# Patient Record
Sex: FEMALE | Race: WHITE | Employment: PART TIME | URBAN - METROPOLITAN AREA
[De-identification: names, ages, dates, MRNs, and addresses within clinical notes are randomized per-mention and may not be internally consistent; named-entity substitution may affect disease eponyms.]

---

## 2017-04-28 ENCOUNTER — TELEPHONE (OUTPATIENT)
Dept: CARDIOLOGY CLINIC | Age: 47
End: 2017-04-28

## 2018-04-03 ENCOUNTER — HOSPITAL ENCOUNTER (OUTPATIENT)
Dept: PHYSICAL THERAPY | Age: 48
Discharge: HOME | End: 2018-04-03
Payer: COMMERCIAL

## 2018-04-03 PROCEDURE — 97162 PT EVAL MOD COMPLEX 30 MIN: CPT | Performed by: PHYSICAL THERAPIST

## 2018-04-03 PROCEDURE — 97110 THERAPEUTIC EXERCISES: CPT | Performed by: PHYSICAL THERAPIST

## 2018-04-03 NOTE — PROGRESS NOTES
PT  EVAL AND TREATMENT    Patient Name: Jenni Friedman  Date:4/3/2018  : 1970  [x]  Patient  Verified  Payor: BLUE CROSS / Plan: [employer] [facility] [place] / Product Type: PPO /    In time:1005am  Out time:100  Total Treatment Time (min): 55  Total Timed Codes (min): 55  1:1 Treatment Time ( only): 55   Visit #: 1     Treatment Area: Chronic left shoulder pain [M25.512, G89.29]  Patient reports L shoulder decreased mvmt over the past couple of months. Patient reports she had hx of R frozen shoulder and RC sx in . She had an injection in L shld on 3/20/18 which significantly decreased pain and increased mvmt. Patient has hx of falls due to MS, and reports several falls in Oct of 2017 which could possibly have caused L shld pain and sx.      Objective evaluation:  Physical Therapy Evaluation - Shoulder    Posture: [] Poor    [x] Fair    [] Good    Describe:Dowagers Hump and FHRS posture  ROM:  [] Unable to assess at this time                                           AROM                                                              PROM   Left Right  Left Right   Flexion 115 130 Flexion 142    Extension 52 68 Extension     Scaption/ 112 Scaption/    ER @ 0 Degrees 45 60 ER @ 0 Degrees     ER @ 90 Degrees 45  ER @ 90 Degrees 50    IR @ 90 Degrees 85  IR @ 90 Degrees 87    FIR                                L3              T9  End Feel / Painful Arc:    Strength:   [] Unable to assess at this time  *  Mild discomfort with all resistance testing =                                                                        L (1-5) R (1-5) Pain   Flexors 4+  [] Yes   [] No   Abductors 4+  [] Yes   [] No   External Rotators 4+  [] Yes   [] No   Internal Rotators 4+  [] Yes   [] No   Supraspinatus 4  [] Yes   [] No   Serratus Anterior   [] Yes   [] No   Lower Trapezius 3+  [] Yes   [] No   Elbow Flexion 4+  [] Yes   [] No   Elbow Extension 4+  [] Yes   [] No       Scapulohumoral Control / Rhythm:  Able to eccentrically lower with good control? Left: [x] Yes   [] No     Right: [x] Yes   [] No    Accessory Motions: decreased inferior /post glide    Palpation  [] Min  [x] Mod  [] Severe    Location:  TTP over L biceps tendon  [] Min  [] Mod  [] Severe    Location:  [] Min  [] Mod  [] Severe    Location:    SPECIAL tests    Adson's Test  [] Pos   [] Neg Yergason's Test [] Pos   [] Neg  [place]'s Test  [] Pos   [] Neg Cortland's Sign [] Pos   [] Neg  Neer's Test  [x] Pos   [] Neg Clunk Test  [] Pos   [] Neg  Hawkin's Test  [] Pos   [] Neg AC Joint  [] Pos   [] Neg  Speed's Test  [x] Pos   [] Neg SC Joint  [] Pos   [] Neg  Empty Can  [] Pos   [] Neg Pectoral Tightness [x] Pos   [] Neg  Anterior Apprehension [] Pos   [] Neg   Posterior Apprehension [] Pos   [] Neg      Other Tests / Comments:       Justification for Eval Code Complexity:  Patient History : MS, Hx of R frozen shld  Examination see exam as above   Clinical Presentation: evolving sx  Clinical Decision Making : FOTO : 56 /100    Manual: 5 min;    Inferior/post glides       Patient Education: [x] Established HEP    [x] POT (minutes) :HEP 15 min    Pain Level (0-10 scale) post treatment: 0  ASSESSMENT  [x]  See Plan of Care    PLAN  [x]  Upgrade activities as tolerated     [x] Other:_ POC  2x/wk for 4 wks    Arielle Rivers, PT 4/3/2018  9:16 AM

## 2018-04-03 NOTE — PROGRESS NOTES
[facility] AT [address]  Phone: [phone] Fax: [phone] / [address] [address] PHYSICAL THERAPY SERVICES  Patient Name: Jarocho Nguyen : 1970   Medical   Diagnosis: Chronic left shoulder pain [M25.512, G89.29] Treatment Diagnosis: L frozen shoulder   Onset Date: 2 months ago      Referral Source: Ceasar Nicole MD Start of Care [facility]): 4/3/2018   Prior Hospitalization: See medical history Provider #: [id number]   Prior Level of Function: Hx of falls due to MS, Hx of R shld sx, I in all ALDs   Comorbidities: MS, and OA   Medications: Verified on Patient Summary List   The Plan of Care and following information is based on the information from the initial evaluation.   ========================================================================  Assessment / key information:  Patient is a 56 y.o. female who presents to [facility] with Dx of L shoulder adhesive capsulitis. Patient reports L shoulder decreased mvmt over the past couple of months. Patient reports she had hx of R frozen shoulder and RC sx in . She had an injection in L shld on 3/20/18 which significantly decreased pain and increased mvmt. Patient has hx of falls due to MS, and reports several falls in Oct of 2017 which could possibly have caused L shld pain and sx. Upon objective evaluation patient demonstrates FHRS posture, AROM per chart below, Pain noted with FIR to L3. Mild pain noted with all resistive testing. Strength: 4+ overall for RC, Lower trap strength: 3+, accessory motion/capsular tightness with Inferior and posterior glides . TTP noted over Long head of biceps and + Speeds noted. A home exercise program was demonstrated and provided to address the above objective and functional deficits. Patient scored 56 on FOTO indicating decreased functional activity level and QOL. Patient can benefit from PT interventions to improve AROM, scapular strength, decrease pain and capsular tightness, to facilitate ADLs & overall functional status. AROM                                                              PROM    Left Right   Left Right   Flexion 115 130 Flexion 142     Extension 52 68 Extension       Scaption/ 112 Scaption/     ER @ 0 Degrees 45 60 ER @ 0 Degrees       ER @ 90 Degrees 45   ER @ 90 Degrees 50     IR @ 90 Degrees 85   IR @ 90 Degrees 87     FIR                                             L3              T9    ========================================================================  Eval Complexity: History: MEDIUM  Complexity : 1-2 comorbidities / personal factors will impact the outcome/ POC Exam:MEDIUM Complexity : 3 Standardized tests and measures addressing body structure, function, activity limitation and / or participation in recreation  Presentation: MEDIUM Complexity : Evolving with changing characteristics  Clinical Decision Making:MEDIUM Complexity : FOTO score of 26-74Overall Complexity:MEDIUM  Problem List: pain affecting function, decrease ROM, decrease strength, decrease ADL/ functional abilitiies, decrease activity tolerance and decrease flexibility/ joint mobility   Treatment Plan may include any combination of the following: Therapeutic exercise, Therapeutic activities, Neuromuscular re-education, Physical agent/modality, Gait/balance training, Manual therapy, Aquatic therapy, Patient education, Self Care training and Functional mobility training  Patient / Family readiness to learn indicated by: asking questions, trying to perform skills and interest  Persons(s) to be included in education: patient (P)  Barriers to Learning/Limitations: None  Measures taken:    Patient Goal (s): \"More movement and to sleep on L side. \"   Patient self reported health status: good  Rehabilitation Potential: good   Short Term Goals:  To be accomplished in  2 weeks:  1) Establish HEP to prevent further disability. 2.) Patient to have increased L shoulder flexion to 120* to facilitate OH reaching.  Long Term Goals: To be accomplished in  4  weeks:  1) Patient to be independent & compliant with HEP in preparation for D/C.  2) Improve overall strength of L shld and scapular mm to 4+/5 strength is available for return to light lifting activities at work/home and for postural support to decrease stress to shld jt. 3) Patient to report 75% improvement in overall function in preparation for return to recreational activities with manageable sx in L shoulder. 4.) Patient able to perform FIR to T12 to allow for dressing and reaching behind. 5) Patient to increase FOTO score to 68 indicating improved functional abilities and QOL    Frequency / Duration:   Patient to be seen  2  times per week for 8-12  treatments:  Patient / Caregiver education and instruction: self care and exercises  G-Codes (GP):   Therapist Signature: Brendan Don PT Date: 2/4/4387   Certification Period:  Time: 9:16 AM   ========================================================================  I certify that the above Physical Therapy Services are being furnished while the patient is under my care. I agree with the treatment plan and certify that this therapy is necessary. Physician Signature:        Date:       Time:   Please sign and return to [facility] or you may fax the signed copy to [phone]. Thank you.

## 2018-04-06 ENCOUNTER — HOSPITAL ENCOUNTER (OUTPATIENT)
Dept: PHYSICAL THERAPY | Age: 48
Discharge: HOME | End: 2018-04-06
Payer: COMMERCIAL

## 2018-04-06 PROCEDURE — 97110 THERAPEUTIC EXERCISES: CPT

## 2018-04-06 PROCEDURE — 97140 MANUAL THERAPY 1/> REGIONS: CPT

## 2018-04-06 NOTE — PROGRESS NOTES
PT DAILY TREATMENT NOTE     Patient Name: Mikel Sosa  Date:2018  : 1970  [x]  Patient  Verified  Payor: BLUE CROSS / Plan: [employer] [facility] [place] / Product Type: PPO /    In time: 9:01 am      Out time: 9:54 am  Total Treatment Time (min): 53  Visit #: 2 of     Treatment Area: Chronic left shoulder pain [M25.512, G89.29]    SUBJECTIVE  Pain Level (0-10 scale): 0  Any medication changes, allergies to medications, adverse drug reactions, diagnosis change, or new procedure performed?: [x] No    [] Yes (see summary sheet for update)  Subjective functional status/changes:   [] No changes reported  \"No pain today, but I haven't really moved it. I have been doing the exercises at home and improving, but I still cannot sleep on my left shoulder. \"    OBJECTIVE  Modality rationale: decrease edema, decrease inflammation and decrease post-therex soreness to improve the patients ability to perform ADLs   Min Type Additional Details    [] Estim: []Att   []Unatt        []TENS instruct                  []IFC  []Premod   []NMES                     []Other:  []w/US   []w/ice   []w/heat  Position:  Location:    []  Traction: [] Cervical       []Lumbar                       [] Prone          []Supine                       []Intermittent   []Continuous Lbs:  [] before manual  [] after manual    []  Ultrasound: []Continuous   [] Pulsed                           []1MHz   []3MHz Location:  W/cm2:    []  Iontophoresis with dexamethasone         Location: [] Take home patch   [] In clinic   10 [x]  Ice     []  heat  []  Ice massage Position: seated  Location: (L) shoulder    []  Vasopneumatic Device Pressure:       [] lo [] med [] hi   Temperature: [] lo [] med [] hi   [x] Skin assessment post-treatment:  [x]intact []redness- no adverse reaction       []redness  adverse reaction:     33 min Therapeutic Exercise:  [x] See flow sheet: initiated therex per IE   Rationale: increase ROM, increase strength and improve coordination to improve the patients ability to perform work duties, [place] reach, lifting    10 min Manual Therapy:  grade III/IV (L) GHJ mobs inferior and posteriorly f/b PROM flexion, scaption, ER and IR   Rationale: decrease pain, increase ROM and increase tissue extensibility to improve the patient's ability to reach OH      X min Patient Education: [x] Review HEP     Other Objective/Functional Measures:   AROM: FIR to T12  AAROM with wall slides: 145 deg  Posture: FHRS - pt cognizant of importance of proper posture to improve pain levels - may consider addition of k-taping for posture NV    Pain Level (0-10 scale) post treatment: 0 (pain); 1 (\"muscle soreness\")    ASSESSMENT/Changes in Function: .  Good tolerance to treatment today with patient req 50% verbal/tactile cueing and demo for proper form/technique with all newly introduced therex. Patient demos (I) with some therex sec hx of (R) shoulder RTC repair and frozen shoulder. Increased capsular tightness inferiorly and posteriorly addressed well with manual interventions. Patient will continue to benefit from skilled PT services to modify and progress therapeutic interventions, address functional mobility deficits, address ROM deficits, address strength deficits, analyze and address soft tissue restrictions, analyze and cue movement patterns and analyze and modify body mechanics/ergonomics to attain remaining goals. [x]  See Plan of Care  []  See progress note/recertification  []  See Discharge Summary         Progress towards goals / Updated goals:  Short Term Goals: To be accomplished in  2  weeks:  1) Establish HEP to prevent further disability.   -Goal progressing; HEP est with all questions answered (4/6/18)  2.) Patient to have increased L shoulder flexion to 120 to facilitate OH reaching.  -Goal progressing; AAROM to 145 deg flexion with wall V's (4/6/18)  Long Term Goals:  To be accomplished in  4  weeks:  1) Patient to be independent & compliant with HEP in preparation for D/C.  2) Improve overall strength of L shld and scapular mm to 4+/5 strength is available for return to light lifting activities at work/home and for postural support to decrease stress to shld jt. 3) Patient to report 75% improvement in overall function in preparation for return to recreational activities with manageable sx in L shoulder.   4.) Patient able to perform FIR to T12 to allow for dressing and reaching behind. -Goal met today following MT; FIR to T12 (4/6/18)  5) Patient to increase FOTO score to 68 indicating improved functional abilities and QOL    PLAN  [x]  Upgrade activities as tolerated     [x]  Continue plan of care  []  Update interventions per flow sheet       []  Discharge due to:_  [x]  Other: assess response to first f/u    Driss Art, PTA 4/6/2018

## 2018-04-09 ENCOUNTER — HOSPITAL ENCOUNTER (OUTPATIENT)
Dept: PHYSICAL THERAPY | Age: 48
Discharge: HOME | End: 2018-04-09
Payer: COMMERCIAL

## 2018-04-09 PROCEDURE — 97110 THERAPEUTIC EXERCISES: CPT

## 2018-04-09 PROCEDURE — 97140 MANUAL THERAPY 1/> REGIONS: CPT

## 2018-04-09 NOTE — PROGRESS NOTES
PT DAILY TREATMENT NOTE     Patient Name: Yesy Mckeon  Date:2018  : 1970  [x]  Patient  Verified  Payor: BLUE CROSS / Plan: [employer] [facility] [place] / Product Type: PPO /    In time: 10:59 am     Out time: 11:42 am  Total Treatment Time (min): 43  Visit #: 3 of     Treatment Area: Chronic left shoulder pain [M25.512, G89.29]    SUBJECTIVE  Pain Level (0-10 scale): 0   Any medication changes, allergies to medications, adverse drug reactions, diagnosis change, or new procedure performed?: [x] No    [] Yes (see summary sheet for update)  Subjective functional status/changes:   [] No changes reported  \"I am getting more mobility in the shoulder, but I know I have to push it. I did have some face numbness over the weekend, but that is more related to my MS. \"    OBJECTIVE  Modality rationale: decrease edema, decrease inflammation and decrease post-therex soreness to improve the patients ability to perform ADLs   Min Type Additional Details    [] Estim: []Att   []Unatt        []TENS instruct                  []IFC  []Premod   []NMES                     []Other:  []w/US   []w/ice   []w/heat  Position:  Location:    []  Traction: [] Cervical       []Lumbar                       [] Prone          []Supine                       []Intermittent   []Continuous Lbs:  [] before manual  [] after manual    []  Ultrasound: []Continuous   [] Pulsed                           []1MHz   []3MHz Location:  W/cm2:    []  Iontophoresis with dexamethasone         Location: [] Take home patch   [] In clinic   NT [x]  Ice     []  heat  []  Ice massage Position: seated  Location: (L) shoulder    []  Vasopneumatic Device Pressure:       [] lo [] med [] hi   Temperature: [] lo [] med [] hi   [x] Skin assessment post-treatment:  [x]intact []redness- no adverse reaction       []redness  adverse reaction:     33 min Therapeutic Exercise:  [x] See flow sheet: added scap BOW CW/CCW   Rationale: increase ROM, increase strength and improve coordination to improve the patients ability to perform work duties, [place] reach, lifting    10 min Manual Therapy:  grade III/IV (L) GHJ mobs inferior and posteriorly f/b PROM flexion, scaption, ER and IR   Rationale: decrease pain, increase ROM and increase tissue extensibility to improve the patient's ability to reach OH      X min Patient Education: [x] Review HEP     Other Objective/Functional Measures:   AAROM flexion with wall slides: 152 deg  HR: 90 bpm    Pain Level (0-10 scale) post treatment: 0    ASSESSMENT/Changes in Function: . Patient notes first f/u treatment did not cause any adverse reactions. Slowly improving shoulder mobility into elevation. Good tolerance to therex, however, pt did note dizziness after prolonged standing (notes MS sx), therefore, held further tx and provided water with improvement in sx. Patient will continue to benefit from skilled PT services to modify and progress therapeutic interventions, address functional mobility deficits, address ROM deficits, address strength deficits, analyze and address soft tissue restrictions, analyze and cue movement patterns and analyze and modify body mechanics/ergonomics to attain remaining goals. [x]  See Plan of Care  []  See progress note/recertification  []  See Discharge Summary         Progress towards goals / Updated goals:  Short Term Goals: To be accomplished in  2  weeks:  1) Establish HEP to prevent further disability.   -Goal progressing; HEP est with all questions answered (4/6/18)  2.) Patient to have increased L shoulder flexion to 120 to facilitate OH reaching.  -Goal progressing; AAROM to 145 deg flexion with wall V's (4/6/18)  Long Term Goals:  To be accomplished in  4  weeks:  1) Patient to be independent & compliant with HEP in preparation for D/C.  2) Improve overall strength of L shld and scapular mm to 4+/5 strength is available for return to light lifting activities at work/home and for postural support to decrease stress to shld jt. 3) Patient to report 75% improvement in overall function in preparation for return to recreational activities with manageable sx in L shoulder.   4.) Patient able to perform FIR to T12 to allow for dressing and reaching behind. -Goal met today following MT; FIR to T12 (4/6/18)  5) Patient to increase FOTO score to 68 indicating improved functional abilities and QOL    PLAN  [x]  Upgrade activities as tolerated     [x]  Continue plan of care  []  Update interventions per flow sheet       []  Discharge due to:_  [x]  Other:_    Madalyn Walsh, SHARYN 4/9/2018

## 2018-04-12 ENCOUNTER — APPOINTMENT (OUTPATIENT)
Dept: PHYSICAL THERAPY | Age: 48
End: 2018-04-12
Payer: COMMERCIAL

## 2018-04-13 ENCOUNTER — APPOINTMENT (OUTPATIENT)
Dept: PHYSICAL THERAPY | Age: 48
End: 2018-04-13
Payer: COMMERCIAL

## 2018-04-16 ENCOUNTER — HOSPITAL ENCOUNTER (OUTPATIENT)
Dept: PHYSICAL THERAPY | Age: 48
Discharge: HOME | End: 2018-04-16
Payer: COMMERCIAL

## 2018-04-16 PROCEDURE — 97110 THERAPEUTIC EXERCISES: CPT

## 2018-04-16 PROCEDURE — 97140 MANUAL THERAPY 1/> REGIONS: CPT

## 2018-04-16 NOTE — PROGRESS NOTES
PT DAILY TREATMENT NOTE     Patient Name: Sreekanth Law  Date:2018  : 1970  [x]  Patient  Verified  Payor: BLUE CROSS / Plan: [facility] [place] / Product Type: PPO /    In time: 10:30 am     Out time: 11:38 am  Total Treatment Time (min): 68  Visit #: 4 of     Treatment Area: Chronic left shoulder pain [M25.512, G89.29]    SUBJECTIVE  Pain Level (0-10 scale): 0   Any medication changes, allergies to medications, adverse drug reactions, diagnosis change, or new procedure performed?: [x] No    [] Yes (see summary sheet for update)  Subjective functional status/changes:   [] No changes reported  \"I fell on Thursday. I have been having a crappy week, normally I can feel it when it comes on, but I just fell (*pt referring to [address] 88*). I lost a little bit of range of motion because I haven't felt enough energy to actually do anything until yesterday. I am seeing the doctor tomorrow for my check up. \"    OBJECTIVE  Modality rationale: decrease edema, decrease inflammation and decrease post-therex soreness to improve the patients ability to perform ADLs   Min Type Additional Details    [] Estim: []Att   []Unatt        []TENS instruct                  []IFC  []Premod   []NMES                     []Other:  []w/US   []w/ice   []w/heat  Position:  Location:    []  Traction: [] Cervical       []Lumbar                       [] Prone          []Supine                       []Intermittent   []Continuous Lbs:  [] before manual  [] after manual    []  Ultrasound: []Continuous   [] Pulsed                           []1MHz   []3MHz Location:  W/cm2:    []  Iontophoresis with dexamethasone         Location: [] Take home patch   [] In clinic   10 [x]  Ice     []  heat  []  Ice massage Position: seated  Location: (L) shoulder    []  Vasopneumatic Device Pressure:       [] lo [] med [] hi   Temperature: [] lo [] med [] hi   [x] Skin assessment post-treatment:  [x]intact []redness- no adverse reaction []redness  adverse reaction:     47 min Therapeutic Exercise:  [x] See flow sheet: modified scap clocks (CW/CCW) in supine versus standing to inc stability of the scap   Rationale: increase ROM, increase strength and improve coordination to improve the patients ability to perform work duties, [place] reach, lifting    11 min Manual Therapy:  grade III/IV (L) GHJ mobs inferior and posteriorly f/b PROM flexion, scaption, ER and (side-lying) FIR   Rationale: decrease pain, increase ROM and increase tissue extensibility to improve the patient's ability to reach OH      X min Patient Education: [x] Review HEP     Other Objective/Functional Measures:     FIR to L3 today, T11 after mobs and self-FIR stretching     Pain Level (0-10 scale) post treatment: 0    ASSESSMENT/Changes in Function: . Patient notes MS flareup prior to last session continuing into the week. Increasing space between exercise to prevent fatigue/MS flareup. Improved FIR as elevation in all directions is [facility]. Patient will continue to benefit from skilled PT services to modify and progress therapeutic interventions, address functional mobility deficits, address ROM deficits, address strength deficits, analyze and address soft tissue restrictions, analyze and cue movement patterns and analyze and modify body mechanics/ergonomics to attain remaining goals. [x]  See Plan of Care  []  See progress note/recertification  []  See Discharge Summary         Progress towards goals / Updated goals:  Short Term Goals: To be accomplished in  2  weeks:  1) Establish HEP to prevent further disability.   -Goal progressing; HEP est with all questions answered (4/6/18)  2.) Patient to have increased L shoulder flexion to 120 to facilitate OH reaching.  -Goal progressing; AAROM to 145 deg flexion with wall V's (4/6/18)  Long Term Goals:  To be accomplished in  4  weeks:  1) Patient to be independent & compliant with HEP in preparation for D/C.  2) Improve overall strength of L shld and scapular mm to 4+/5 strength is available for return to light lifting activities at work/home and for postural support to decrease stress to shld jt. 3) Patient to report 75% improvement in overall function in preparation for return to recreational activities with manageable sx in L shoulder. 4.) Patient able to perform FIR to T12 to allow for dressing and reaching behind. -Goal met today following MT; FIR to T11 (4/16/18)  5) Patient to increase FOTO score to 68 indicating improved functional abilities and QOL.     PLAN  [x]  Upgrade activities as tolerated     [x]  Continue plan of care  []  Update interventions per flow sheet       []  Discharge due to:_  []  Other:_    Nicholas Gibson, SHARYN 4/16/2018

## 2018-04-19 ENCOUNTER — APPOINTMENT (OUTPATIENT)
Dept: PHYSICAL THERAPY | Age: 48
End: 2018-04-19
Payer: COMMERCIAL

## 2018-04-23 ENCOUNTER — APPOINTMENT (OUTPATIENT)
Dept: PHYSICAL THERAPY | Age: 48
End: 2018-04-23
Payer: COMMERCIAL

## 2018-04-24 NOTE — PROGRESS NOTES
[address] [facility] [address] [facility] AT [address]. Dolores [place], Matt Chowdary [address]  Phone: [phone] Fax [phone] SUMMARY  Patient Name: Jhonny Buitrago : 1970   Treatment/Medical Diagnosis: Chronic left shoulder pain [M25.512, G89.29]   Referral Source: Toby Eastman MD     Date of Initial Visit: 4/3/18 Attended Visits: 4 Missed Visits: 0     SUMMARY OF TREATMENT  Patient being seen in PT for a dx of L shld pain. Treatment consist of therapeutic exercise including ROM, stretching,  strengthening, stabilization training, postural ed, patient education, HEP instruction, CP, and Manual therapy. CURRENT STATUS  Patient was seen for a total of 4 treatment sessions. Patient called on  and reports MD stated she no longer needed PT. Status of goals as listed below. Patient performed therapy over a 10 day session, so no appreciable strength gains noted. Progress towards goals / Updated goals:  Short Term Goals: To be accomplished in  2  weeks:  1) Establish HEP to prevent further disability.   -Goal progressing; HEP est with all questions answered (18)  2.) Patient to have increased L shoulder flexion to 120 to facilitate OH reaching.  -Goal progressing; AAROM to 145 deg flexion with wall V's (18)  Long Term Goals: To be accomplished in  4  weeks:  1) Patient to be independent & compliant with HEP in preparation for D/C.  2) Improve overall strength of L shld and scapular mm to 4+/5 strength is available for return to light lifting activities at work/home and for postural support to decrease stress to shld jt. 3) Patient to report 75% improvement in overall function in preparation for return to recreational activities with manageable sx in L shoulder.   4.) Patient able to perform FIR to T12 to allow for dressing and reaching behind. -Goal met today following MT; FIR to T11 (18)  5) Patient to increase FOTO score to 68 indicating improved functional abilities and QOL. Unknown as pt did not return. RECOMMENDATIONS  Discontinue therapy. Progressing towards or have reached established goals. Gcode: If you have any questions/comments please contact us directly at [phone]. Thank you for allowing us to assist in the care of your patient.   Therapist Signature: Lon Honeycutt PT Date: 4/16/18   Reporting Period:  Time: 1:29 PM

## 2018-04-26 ENCOUNTER — APPOINTMENT (OUTPATIENT)
Dept: PHYSICAL THERAPY | Age: 48
End: 2018-04-26
Payer: COMMERCIAL

## 2019-02-26 ENCOUNTER — HOSPITAL ENCOUNTER (OUTPATIENT)
Dept: LAB | Age: 49
Discharge: HOME | End: 2019-02-26
Payer: COMMERCIAL

## 2019-02-26 PROCEDURE — 88175 CYTOPATH C/V AUTO FLUID REDO: CPT

## 2019-02-26 PROCEDURE — 87624 HPV HI-RISK TYP POOLED RSLT: CPT

## 2022-05-23 ENCOUNTER — HOSPITAL ENCOUNTER (OUTPATIENT)
Dept: LAB | Age: 52
Discharge: HOME | End: 2022-05-23
Payer: COMMERCIAL

## 2022-05-23 PROCEDURE — 88175 CYTOPATH C/V AUTO FLUID REDO: CPT

## 2022-05-23 PROCEDURE — 87624 HPV HI-RISK TYP POOLED RSLT: CPT
